# Patient Record
Sex: FEMALE | Race: WHITE | NOT HISPANIC OR LATINO | URBAN - METROPOLITAN AREA
[De-identification: names, ages, dates, MRNs, and addresses within clinical notes are randomized per-mention and may not be internally consistent; named-entity substitution may affect disease eponyms.]

---

## 2018-06-14 ENCOUNTER — EMERGENCY (EMERGENCY)
Facility: HOSPITAL | Age: 58
LOS: 1 days | Discharge: HOME | End: 2018-06-14
Attending: EMERGENCY MEDICINE
Payer: COMMERCIAL

## 2018-06-14 VITALS
SYSTOLIC BLOOD PRESSURE: 156 MMHG | HEIGHT: 63 IN | WEIGHT: 134.92 LBS | DIASTOLIC BLOOD PRESSURE: 88 MMHG | OXYGEN SATURATION: 98 % | TEMPERATURE: 98 F | HEART RATE: 84 BPM | RESPIRATION RATE: 18 BRPM

## 2018-06-14 LAB
ALBUMIN SERPL ELPH-MCNC: 4.5 G/DL — SIGNIFICANT CHANGE UP (ref 3.3–5.2)
ALP SERPL-CCNC: 72 U/L — SIGNIFICANT CHANGE UP (ref 40–120)
ALT FLD-CCNC: 20 U/L — SIGNIFICANT CHANGE UP
ANION GAP SERPL CALC-SCNC: 14 MMOL/L — SIGNIFICANT CHANGE UP (ref 5–17)
AST SERPL-CCNC: 20 U/L — SIGNIFICANT CHANGE UP
BASOPHILS # BLD AUTO: 0 K/UL — SIGNIFICANT CHANGE UP (ref 0–0.2)
BASOPHILS NFR BLD AUTO: 0.4 % — SIGNIFICANT CHANGE UP (ref 0–2)
BILIRUB SERPL-MCNC: 0.3 MG/DL — LOW (ref 0.4–2)
BUN SERPL-MCNC: 14 MG/DL — SIGNIFICANT CHANGE UP (ref 8–20)
CALCIUM SERPL-MCNC: 9.6 MG/DL — SIGNIFICANT CHANGE UP (ref 8.6–10.2)
CHLORIDE SERPL-SCNC: 104 MMOL/L — SIGNIFICANT CHANGE UP (ref 98–107)
CO2 SERPL-SCNC: 25 MMOL/L — SIGNIFICANT CHANGE UP (ref 22–29)
CREAT SERPL-MCNC: 0.68 MG/DL — SIGNIFICANT CHANGE UP (ref 0.5–1.3)
EOSINOPHIL # BLD AUTO: 0.2 K/UL — SIGNIFICANT CHANGE UP (ref 0–0.5)
EOSINOPHIL NFR BLD AUTO: 3.9 % — SIGNIFICANT CHANGE UP (ref 0–6)
GLUCOSE SERPL-MCNC: 104 MG/DL — SIGNIFICANT CHANGE UP (ref 70–115)
HCT VFR BLD CALC: 38.1 % — SIGNIFICANT CHANGE UP (ref 37–47)
HGB BLD-MCNC: 12.7 G/DL — SIGNIFICANT CHANGE UP (ref 12–16)
LYMPHOCYTES # BLD AUTO: 1.8 K/UL — SIGNIFICANT CHANGE UP (ref 1–4.8)
LYMPHOCYTES # BLD AUTO: 36.5 % — SIGNIFICANT CHANGE UP (ref 20–55)
MCHC RBC-ENTMCNC: 30.1 PG — SIGNIFICANT CHANGE UP (ref 27–31)
MCHC RBC-ENTMCNC: 33.3 G/DL — SIGNIFICANT CHANGE UP (ref 32–36)
MCV RBC AUTO: 90.3 FL — SIGNIFICANT CHANGE UP (ref 81–99)
MONOCYTES # BLD AUTO: 0.6 K/UL — SIGNIFICANT CHANGE UP (ref 0–0.8)
MONOCYTES NFR BLD AUTO: 11.6 % — HIGH (ref 3–10)
NEUTROPHILS # BLD AUTO: 2.3 K/UL — SIGNIFICANT CHANGE UP (ref 1.8–8)
NEUTROPHILS NFR BLD AUTO: 47.2 % — SIGNIFICANT CHANGE UP (ref 37–73)
PLATELET # BLD AUTO: 216 K/UL — SIGNIFICANT CHANGE UP (ref 150–400)
POTASSIUM SERPL-MCNC: 4.1 MMOL/L — SIGNIFICANT CHANGE UP (ref 3.5–5.3)
POTASSIUM SERPL-SCNC: 4.1 MMOL/L — SIGNIFICANT CHANGE UP (ref 3.5–5.3)
PROT SERPL-MCNC: 7.6 G/DL — SIGNIFICANT CHANGE UP (ref 6.6–8.7)
RBC # BLD: 4.22 M/UL — LOW (ref 4.4–5.2)
RBC # FLD: 13 % — SIGNIFICANT CHANGE UP (ref 11–15.6)
SODIUM SERPL-SCNC: 143 MMOL/L — SIGNIFICANT CHANGE UP (ref 135–145)
WBC # BLD: 4.9 K/UL — SIGNIFICANT CHANGE UP (ref 4.8–10.8)
WBC # FLD AUTO: 4.9 K/UL — SIGNIFICANT CHANGE UP (ref 4.8–10.8)

## 2018-06-14 PROCEDURE — 99284 EMERGENCY DEPT VISIT MOD MDM: CPT | Mod: 25

## 2018-06-14 PROCEDURE — 85027 COMPLETE CBC AUTOMATED: CPT

## 2018-06-14 PROCEDURE — 36415 COLL VENOUS BLD VENIPUNCTURE: CPT

## 2018-06-14 PROCEDURE — 96374 THER/PROPH/DIAG INJ IV PUSH: CPT

## 2018-06-14 PROCEDURE — 80053 COMPREHEN METABOLIC PANEL: CPT

## 2018-06-14 RX ORDER — AZTREONAM 2 G
1 VIAL (EA) INJECTION
Qty: 20 | Refills: 0 | OUTPATIENT
Start: 2018-06-14 | End: 2018-06-23

## 2018-06-14 RX ORDER — PIPERACILLIN AND TAZOBACTAM 4; .5 G/20ML; G/20ML
3.38 INJECTION, POWDER, LYOPHILIZED, FOR SOLUTION INTRAVENOUS ONCE
Qty: 0 | Refills: 0 | Status: COMPLETED | OUTPATIENT
Start: 2018-06-14 | End: 2018-06-14

## 2018-06-14 RX ADMIN — PIPERACILLIN AND TAZOBACTAM 200 GRAM(S): 4; .5 INJECTION, POWDER, LYOPHILIZED, FOR SOLUTION INTRAVENOUS at 06:35

## 2018-06-14 RX ADMIN — Medication 1 TABLET(S): at 06:35

## 2018-06-14 NOTE — ED PROVIDER NOTE - OBJECTIVE STATEMENT
57y old with complaints of left arm rash went to see pmd, given antifungal cream, applied, and noted gradual increasing erythema, on medial aspect of left arm, and another area on leg. gradual, did not take Tylenol or motrin. complaints of itching, gradual increasing. no relieving factors

## 2018-06-14 NOTE — ED PROVIDER NOTE - PHYSICAL EXAMINATION
Constitutional : Appears comfortably, talking in full sentences  Head :NC AT , no swelling  Eyes :eomi, no swelling  Mouth :mm moist,  Neck : supple, trachea in midline  Chest :Kelvin air entry, symm chest expansion, no distress  Heart :S1 S2 distant  Abdomen :abd soft, non tender  Musc/Skel :ext no swelling, except medial aspect of left arm  erythema, warm, tenderness, impetigo  no deformity, no spine tenderness, distal pulses present  Neuro  :AAO 3 no focal deficits

## 2018-06-14 NOTE — ED ADULT TRIAGE NOTE - CHIEF COMPLAINT QUOTE
I am having numbness in my lt hand it started 3 hrs ago.  I am using a cream for ring worm I don't know if it is doing it

## 2018-06-14 NOTE — ED PROVIDER NOTE - NS ED ROS FT
no weight change, no fever or chills  no recent travel, no recent abox, no sick contacts  no recent change in medications  + rash, no bruises  no visual changes no eye discharge  no cough cold or congestion,   no sob, no chest pain  no orthopnea, no pnd  no abd pain, no n/v/d  no hematuria, no change in urinary habits  no joint pain, no deformity  no headache, no paresthesia

## 2018-06-14 NOTE — ED ADULT NURSE NOTE - OBJECTIVE STATEMENT
Pt received in CDU 1-R c/o "stiffness to my left hand when I was griping the streering wheel". Pt alsdo with fungal ringworm infection that pt started applying Ketoconazole to earlier today. Pt noted to have a red induration surrounding her ringworm infection which is located to inner left bicep. Pt also states she has a wound to the lateral aspect of her left thigh that her DrNadeen states is the same thing. Pt has been applying cream to the thigh as well and there is no induration noted to thigh. Pt denies fever/chills, N/V/D, cough, chest pain, HA, dizziness, weakness, numbness/tingling. Pt able to MAEx4 with equal strength and purpose. No focal deficits noted.

## 2018-06-21 ENCOUNTER — INPATIENT (INPATIENT)
Facility: HOSPITAL | Age: 58
LOS: 3 days | Discharge: HOME | DRG: 603 | End: 2018-06-25
Attending: INTERNAL MEDICINE | Admitting: GENERAL ACUTE CARE HOSPITAL
Payer: COMMERCIAL

## 2018-06-21 VITALS
SYSTOLIC BLOOD PRESSURE: 133 MMHG | RESPIRATION RATE: 20 BRPM | HEART RATE: 104 BPM | OXYGEN SATURATION: 99 % | TEMPERATURE: 99 F | DIASTOLIC BLOOD PRESSURE: 90 MMHG | WEIGHT: 138.01 LBS | HEIGHT: 63 IN

## 2018-06-21 LAB
ALBUMIN SERPL ELPH-MCNC: 4.3 G/DL — SIGNIFICANT CHANGE UP (ref 3.3–5.2)
ALP SERPL-CCNC: 93 U/L — SIGNIFICANT CHANGE UP (ref 40–120)
ALT FLD-CCNC: 106 U/L — HIGH
ANION GAP SERPL CALC-SCNC: 16 MMOL/L — SIGNIFICANT CHANGE UP (ref 5–17)
APPEARANCE UR: CLEAR — SIGNIFICANT CHANGE UP
AST SERPL-CCNC: 56 U/L — HIGH
BASOPHILS # BLD AUTO: 0 K/UL — SIGNIFICANT CHANGE UP (ref 0–0.2)
BASOPHILS NFR BLD AUTO: 0.3 % — SIGNIFICANT CHANGE UP (ref 0–2)
BILIRUB SERPL-MCNC: 0.3 MG/DL — LOW (ref 0.4–2)
BILIRUB UR-MCNC: NEGATIVE — SIGNIFICANT CHANGE UP
BUN SERPL-MCNC: 14 MG/DL — SIGNIFICANT CHANGE UP (ref 8–20)
CALCIUM SERPL-MCNC: 9.3 MG/DL — SIGNIFICANT CHANGE UP (ref 8.6–10.2)
CHLORIDE SERPL-SCNC: 98 MMOL/L — SIGNIFICANT CHANGE UP (ref 98–107)
CK SERPL-CCNC: 94 U/L — SIGNIFICANT CHANGE UP (ref 25–170)
CO2 SERPL-SCNC: 20 MMOL/L — LOW (ref 22–29)
COLOR SPEC: YELLOW — SIGNIFICANT CHANGE UP
CREAT SERPL-MCNC: 0.93 MG/DL — SIGNIFICANT CHANGE UP (ref 0.5–1.3)
DIFF PNL FLD: ABNORMAL
EOSINOPHIL # BLD AUTO: 0.1 K/UL — SIGNIFICANT CHANGE UP (ref 0–0.5)
EOSINOPHIL NFR BLD AUTO: 2.3 % — SIGNIFICANT CHANGE UP (ref 0–6)
GLUCOSE SERPL-MCNC: 131 MG/DL — HIGH (ref 70–115)
GLUCOSE UR QL: NEGATIVE MG/DL — SIGNIFICANT CHANGE UP
HCT VFR BLD CALC: 36.6 % — LOW (ref 37–47)
HGB BLD-MCNC: 12.6 G/DL — SIGNIFICANT CHANGE UP (ref 12–16)
KETONES UR-MCNC: ABNORMAL
LACTATE BLDV-MCNC: 1.6 MMOL/L — SIGNIFICANT CHANGE UP (ref 0.5–2)
LEUKOCYTE ESTERASE UR-ACNC: NEGATIVE — SIGNIFICANT CHANGE UP
LIDOCAIN IGE QN: 23 U/L — SIGNIFICANT CHANGE UP (ref 22–51)
LYMPHOCYTES # BLD AUTO: 0.5 K/UL — LOW (ref 1–4.8)
LYMPHOCYTES # BLD AUTO: 12.3 % — LOW (ref 20–55)
MCHC RBC-ENTMCNC: 30.4 PG — SIGNIFICANT CHANGE UP (ref 27–31)
MCHC RBC-ENTMCNC: 34.4 G/DL — SIGNIFICANT CHANGE UP (ref 32–36)
MCV RBC AUTO: 88.2 FL — SIGNIFICANT CHANGE UP (ref 81–99)
MONOCYTES # BLD AUTO: 0.6 K/UL — SIGNIFICANT CHANGE UP (ref 0–0.8)
MONOCYTES NFR BLD AUTO: 14.3 % — HIGH (ref 3–10)
NEUTROPHILS # BLD AUTO: 2.7 K/UL — SIGNIFICANT CHANGE UP (ref 1.8–8)
NEUTROPHILS NFR BLD AUTO: 68 % — SIGNIFICANT CHANGE UP (ref 37–73)
NITRITE UR-MCNC: NEGATIVE — SIGNIFICANT CHANGE UP
PH UR: 6 — SIGNIFICANT CHANGE UP (ref 5–8)
PLATELET # BLD AUTO: 247 K/UL — SIGNIFICANT CHANGE UP (ref 150–400)
POTASSIUM SERPL-MCNC: 4 MMOL/L — SIGNIFICANT CHANGE UP (ref 3.5–5.3)
POTASSIUM SERPL-SCNC: 4 MMOL/L — SIGNIFICANT CHANGE UP (ref 3.5–5.3)
PROT SERPL-MCNC: 7.7 G/DL — SIGNIFICANT CHANGE UP (ref 6.6–8.7)
PROT UR-MCNC: 30 MG/DL
RBC # BLD: 4.15 M/UL — LOW (ref 4.4–5.2)
RBC # FLD: 12.9 % — SIGNIFICANT CHANGE UP (ref 11–15.6)
SODIUM SERPL-SCNC: 134 MMOL/L — LOW (ref 135–145)
SP GR SPEC: 1.02 — SIGNIFICANT CHANGE UP (ref 1.01–1.02)
UROBILINOGEN FLD QL: NEGATIVE MG/DL — SIGNIFICANT CHANGE UP
WBC # BLD: 4 K/UL — LOW (ref 4.8–10.8)
WBC # FLD AUTO: 4 K/UL — LOW (ref 4.8–10.8)

## 2018-06-21 PROCEDURE — 76705 ECHO EXAM OF ABDOMEN: CPT | Mod: 26

## 2018-06-21 PROCEDURE — 99285 EMERGENCY DEPT VISIT HI MDM: CPT | Mod: 25

## 2018-06-21 RX ORDER — DIPHENHYDRAMINE HCL 50 MG
50 CAPSULE ORAL EVERY 4 HOURS
Qty: 0 | Refills: 0 | Status: DISCONTINUED | OUTPATIENT
Start: 2018-06-21 | End: 2018-06-25

## 2018-06-21 RX ORDER — KETOROLAC TROMETHAMINE 30 MG/ML
30 SYRINGE (ML) INJECTION ONCE
Qty: 0 | Refills: 0 | Status: DISCONTINUED | OUTPATIENT
Start: 2018-06-21 | End: 2018-06-21

## 2018-06-21 RX ORDER — FAMOTIDINE 10 MG/ML
20 INJECTION INTRAVENOUS ONCE
Qty: 0 | Refills: 0 | Status: COMPLETED | OUTPATIENT
Start: 2018-06-21 | End: 2018-06-21

## 2018-06-21 RX ORDER — ONDANSETRON 8 MG/1
4 TABLET, FILM COATED ORAL ONCE
Qty: 0 | Refills: 0 | Status: COMPLETED | OUTPATIENT
Start: 2018-06-21 | End: 2018-06-21

## 2018-06-21 RX ADMIN — Medication 30 MILLIGRAM(S): at 20:46

## 2018-06-21 RX ADMIN — ONDANSETRON 4 MILLIGRAM(S): 8 TABLET, FILM COATED ORAL at 20:46

## 2018-06-21 NOTE — ED PROVIDER NOTE - SHIFT CHANGE DETAILS
F/U CT scan  If no acute pathology on CT PO challenge and treat for cellulitis change to another abx.

## 2018-06-21 NOTE — ED PROVIDER NOTE - MEDICAL DECISION MAKING DETAILS
Abdominal pain, nausea, and vomiting with RUQ tenderness on exam.  Will provide anti-emetics, pain medication, check labs, US, and Re-eval  Right chest wall redness appearing as cellulitis patient currently on bactrim will give clindamycin.

## 2018-06-21 NOTE — ED PROVIDER NOTE - OBJECTIVE STATEMENT
This patient is a 58 year old woman recently seen in the ER 1 week ago for redness of the left arm discharged with Bactrim who presents to the ER c/o redness to right upper chest wall x 2 days and nausea and vomiting today.  Patient states that the redness that occurred during last ER visit improved.  2 days ago she noticed redness to the right upper chest that has been getting worse.  The redness is associated with itching.  She went to her PMD and was told to use of cortisone cream.  Patient has still been taking Bactrim twice a day as instructed for last cellulitis diagnosis one week ago.  Today she developed severe diffuse abdominal pain and vomiting.  She denies fever, dysuria, and hematuria.

## 2018-06-22 DIAGNOSIS — L03.90 CELLULITIS, UNSPECIFIED: ICD-10-CM

## 2018-06-22 PROCEDURE — 99223 1ST HOSP IP/OBS HIGH 75: CPT

## 2018-06-22 PROCEDURE — 71260 CT THORAX DX C+: CPT | Mod: 26

## 2018-06-22 PROCEDURE — 99218: CPT

## 2018-06-22 PROCEDURE — 74177 CT ABD & PELVIS W/CONTRAST: CPT | Mod: 26

## 2018-06-22 RX ORDER — HYDROXYZINE HCL 10 MG
50 TABLET ORAL ONCE
Qty: 0 | Refills: 0 | Status: COMPLETED | OUTPATIENT
Start: 2018-06-22 | End: 2018-06-22

## 2018-06-22 RX ORDER — ACETAMINOPHEN 500 MG
650 TABLET ORAL ONCE
Qty: 0 | Refills: 0 | Status: COMPLETED | OUTPATIENT
Start: 2018-06-22 | End: 2018-06-22

## 2018-06-22 RX ORDER — MUPIROCIN 20 MG/G
1 OINTMENT TOPICAL EVERY 8 HOURS
Qty: 0 | Refills: 0 | Status: DISCONTINUED | OUTPATIENT
Start: 2018-06-22 | End: 2018-06-25

## 2018-06-22 RX ORDER — SODIUM CHLORIDE 9 MG/ML
1000 INJECTION INTRAMUSCULAR; INTRAVENOUS; SUBCUTANEOUS
Qty: 0 | Refills: 0 | Status: COMPLETED | OUTPATIENT
Start: 2018-06-22 | End: 2018-06-23

## 2018-06-22 RX ORDER — PIPERACILLIN AND TAZOBACTAM 4; .5 G/20ML; G/20ML
3.38 INJECTION, POWDER, LYOPHILIZED, FOR SOLUTION INTRAVENOUS ONCE
Qty: 0 | Refills: 0 | Status: DISCONTINUED | OUTPATIENT
Start: 2018-06-22 | End: 2018-06-22

## 2018-06-22 RX ORDER — FAMOTIDINE 10 MG/ML
20 INJECTION INTRAVENOUS ONCE
Qty: 0 | Refills: 0 | Status: COMPLETED | OUTPATIENT
Start: 2018-06-22 | End: 2018-06-22

## 2018-06-22 RX ORDER — SACCHAROMYCES BOULARDII 250 MG
250 POWDER IN PACKET (EA) ORAL
Qty: 0 | Refills: 0 | Status: DISCONTINUED | OUTPATIENT
Start: 2018-06-22 | End: 2018-06-25

## 2018-06-22 RX ORDER — VANCOMYCIN HCL 1 G
1000 VIAL (EA) INTRAVENOUS ONCE
Qty: 0 | Refills: 0 | Status: COMPLETED | OUTPATIENT
Start: 2018-06-22 | End: 2018-06-22

## 2018-06-22 RX ORDER — DIPHENHYDRAMINE HCL 50 MG
25 CAPSULE ORAL EVERY 6 HOURS
Qty: 0 | Refills: 0 | Status: DISCONTINUED | OUTPATIENT
Start: 2018-06-22 | End: 2018-06-22

## 2018-06-22 RX ORDER — DAPTOMYCIN 500 MG/10ML
380 INJECTION, POWDER, LYOPHILIZED, FOR SOLUTION INTRAVENOUS EVERY 24 HOURS
Qty: 0 | Refills: 0 | Status: DISCONTINUED | OUTPATIENT
Start: 2018-06-22 | End: 2018-06-25

## 2018-06-22 RX ORDER — PIPERACILLIN AND TAZOBACTAM 4; .5 G/20ML; G/20ML
3.38 INJECTION, POWDER, LYOPHILIZED, FOR SOLUTION INTRAVENOUS ONCE
Qty: 0 | Refills: 0 | Status: COMPLETED | OUTPATIENT
Start: 2018-06-22 | End: 2018-06-22

## 2018-06-22 RX ORDER — ENOXAPARIN SODIUM 100 MG/ML
40 INJECTION SUBCUTANEOUS DAILY
Qty: 0 | Refills: 0 | Status: DISCONTINUED | OUTPATIENT
Start: 2018-06-23 | End: 2018-06-25

## 2018-06-22 RX ORDER — CETIRIZINE HYDROCHLORIDE 10 MG/1
10 TABLET ORAL ONCE
Qty: 0 | Refills: 0 | Status: COMPLETED | OUTPATIENT
Start: 2018-06-22 | End: 2018-06-22

## 2018-06-22 RX ORDER — FAMOTIDINE 10 MG/ML
20 INJECTION INTRAVENOUS
Qty: 0 | Refills: 0 | Status: DISCONTINUED | OUTPATIENT
Start: 2018-06-22 | End: 2018-06-25

## 2018-06-22 RX ORDER — PIPERACILLIN AND TAZOBACTAM 4; .5 G/20ML; G/20ML
3.38 INJECTION, POWDER, LYOPHILIZED, FOR SOLUTION INTRAVENOUS EVERY 6 HOURS
Qty: 0 | Refills: 0 | Status: DISCONTINUED | OUTPATIENT
Start: 2018-06-22 | End: 2018-06-22

## 2018-06-22 RX ORDER — ACETAMINOPHEN 500 MG
650 TABLET ORAL EVERY 6 HOURS
Qty: 0 | Refills: 0 | Status: DISCONTINUED | OUTPATIENT
Start: 2018-06-22 | End: 2018-06-25

## 2018-06-22 RX ORDER — SODIUM CHLORIDE 9 MG/ML
1000 INJECTION INTRAMUSCULAR; INTRAVENOUS; SUBCUTANEOUS ONCE
Qty: 0 | Refills: 0 | Status: COMPLETED | OUTPATIENT
Start: 2018-06-22 | End: 2018-06-22

## 2018-06-22 RX ORDER — PIPERACILLIN AND TAZOBACTAM 4; .5 G/20ML; G/20ML
INJECTION, POWDER, LYOPHILIZED, FOR SOLUTION INTRAVENOUS
Qty: 0 | Refills: 0 | Status: DISCONTINUED | OUTPATIENT
Start: 2018-06-22 | End: 2018-06-22

## 2018-06-22 RX ORDER — CETIRIZINE HYDROCHLORIDE 10 MG/1
10 TABLET ORAL ONCE
Qty: 0 | Refills: 0 | Status: DISCONTINUED | OUTPATIENT
Start: 2018-06-22 | End: 2018-06-22

## 2018-06-22 RX ADMIN — DAPTOMYCIN 115.2 MILLIGRAM(S): 500 INJECTION, POWDER, LYOPHILIZED, FOR SOLUTION INTRAVENOUS at 14:18

## 2018-06-22 RX ADMIN — Medication 30 MILLIGRAM(S): at 00:23

## 2018-06-22 RX ADMIN — SODIUM CHLORIDE 75 MILLILITER(S): 9 INJECTION INTRAMUSCULAR; INTRAVENOUS; SUBCUTANEOUS at 17:02

## 2018-06-22 RX ADMIN — Medication 100 MILLIGRAM(S): at 00:14

## 2018-06-22 RX ADMIN — Medication 50 MILLIGRAM(S): at 04:36

## 2018-06-22 RX ADMIN — Medication 650 MILLIGRAM(S): at 13:03

## 2018-06-22 RX ADMIN — Medication 250 MILLIGRAM(S): at 05:20

## 2018-06-22 RX ADMIN — PIPERACILLIN AND TAZOBACTAM 200 GRAM(S): 4; .5 INJECTION, POWDER, LYOPHILIZED, FOR SOLUTION INTRAVENOUS at 04:42

## 2018-06-22 RX ADMIN — Medication 50 MILLIGRAM(S): at 21:50

## 2018-06-22 RX ADMIN — FAMOTIDINE 20 MILLIGRAM(S): 10 INJECTION INTRAVENOUS at 17:39

## 2018-06-22 RX ADMIN — SODIUM CHLORIDE 1000 MILLILITER(S): 9 INJECTION INTRAMUSCULAR; INTRAVENOUS; SUBCUTANEOUS at 13:04

## 2018-06-22 RX ADMIN — FAMOTIDINE 20 MILLIGRAM(S): 10 INJECTION INTRAVENOUS at 13:08

## 2018-06-22 RX ADMIN — CETIRIZINE HYDROCHLORIDE 10 MILLIGRAM(S): 10 TABLET ORAL at 14:49

## 2018-06-22 RX ADMIN — FAMOTIDINE 20 MILLIGRAM(S): 10 INJECTION INTRAVENOUS at 00:15

## 2018-06-22 RX ADMIN — Medication 650 MILLIGRAM(S): at 14:00

## 2018-06-22 RX ADMIN — Medication 40 MILLIGRAM(S): at 16:59

## 2018-06-22 RX ADMIN — Medication 250 MILLIGRAM(S): at 17:01

## 2018-06-22 RX ADMIN — Medication 50 MILLIGRAM(S): at 00:16

## 2018-06-22 NOTE — ED ADULT NURSE REASSESSMENT NOTE - NS ED NURSE REASSESS COMMENT FT1
ao color good skin warm dry resp even unlabored iv infusing w/o diff site clean dsg intact no further c/o nausea itch improved with medication.  received abx w/o diff to be observed

## 2018-06-22 NOTE — ED CDU PROVIDER DISPOSITION NOTE - CLINICAL COURSE
Pt kept on observation for antibiotics; developed worsening urticaria throughout stay despite multiple antihistamines with no evidence of anaphylaxis; pt also spiked fever during her observation;  admitted to hospital for further management.

## 2018-06-22 NOTE — H&P ADULT - FAMILY HISTORY
Sibling  Still living? Unknown  Family history of mixed connective tissue disease, Age at diagnosis: Age Unknown

## 2018-06-22 NOTE — H&P ADULT - HISTORY OF PRESENT ILLNESS
Patient is 58 year old female with PMH of Bilateral Tubal Ligation who presented to the ED with complaints of right upper chest wall circumscribed rash. Patient reports she initially reported to the ED on 6/14 for evaluation of a left arm rash. She had went to see her PMD who prescribed an antifungal cream, but she noted increasing erythema on the medial aspect of left arm and was sent to the ED. Patient was then diagnosed with Erysipelas and discharged home with Bactrim. Patient reports on the 8th day she developed a right upper chest wall rash. She initially thought it was a reaction to the bactrim, but was instructed to report to the ED due to concerns for cellulitis. In the ED, patient was given clindamycin, followed by vanco and zosyn and developed a generalized macular-papular rash (chest/abdomen/back) without any respiratory compromise. ID was consulted and patient was switched to Daptomycin. Patient also reports having diarrhea while on bactrim, which has resolved but yesterday she reports having abdominal pain and vomiting. In the ED, patient had a CT abdomen/pelvis and US which was negative for any intra-abdominal pathology. Patient does report having a fever, but denies chills, headache, blurry vision, chest pain, SOB, nausea/vomiting or diarrhea at this present time. LFTS mildly elevated and likely due to bactrim.

## 2018-06-22 NOTE — H&P ADULT - ASSESSMENT
Patient is 58 year old female with PMH of Bilateral Tubal Ligation who presented to the ED with complaints of right upper chest wall circumscribed rash and admitted with cellulitis. Subsequently, developed an allergic reaction to antibiotics administered in the ED.    1. Right upper chest wall cellulitis  -admit to general medical floor  -sepsis not present on admission  -IV daptomycin per ID  -f/u blood cultures  -florastar BID  -monitor fever curve  -judicious hydration    2. Allergic reaction  -? PCN allergy  -IV benadryl  -Pepcid BID  -short course of prednisone    3. Nausea/vomiting  -likely secondary to bactrim, which has now resolved  -zofran PRN  -CT abdomen unremarkable    4. Transaminitis  -normal LFTS on 6/14  -now with elevation post bactrim treatment  -trend LFTS  -CT and ultrasound negative    5. Anion gap metabolic acidosis  -secondary to starvation ketoacidosis  -lactate within normal limits  -patient with poor PO intake prior to presenting to the ED  -able to tolerate PO in the ER    6. Mild hyponatremia  -likely due to hypovolemia  -trial of judicious hydration  -urine lytes  -monitor serum sodium closely    6. DVT prophylaxis - early ambulation Lovenox SC Patient is 58 year old female with PMH of Bilateral Tubal Ligation who presented to the ED with complaints of right upper chest wall circumscribed rash and admitted with cellulitis. Subsequently, developed an allergic reaction to antibiotics administered in the ED.    1. Right upper chest wall cellulitis  -admit to general medical floor  -sepsis not present on admission  -IV daptomycin per ID  -f/u blood cultures  -florastar BID  -monitor fever curve  -judicious hydration  -CT chest - slight asymmetric skin thickening in the superior lateral right chest. There is no subcutaneous stranding or fluid collection    2. Allergic reaction  -? PCN allergy  -IV benadryl  -Pepcid BID  -short course of prednisone    3. Nausea/vomiting  -likely secondary to bactrim, which has now resolved  -zofran PRN  -CT abdomen unremarkable    4. Transaminitis  -normal LFTS on 6/14  -now with elevation post bactrim treatment  -trend LFTS  -CT and ultrasound negative    5. Anion gap metabolic acidosis  -secondary to starvation ketoacidosis  -lactate within normal limits  -patient with poor PO intake prior to presenting to the ED  -able to tolerate PO in the ER    6. Mild hyponatremia  -likely due to hypovolemia  -trial of judicious hydration  -urine lytes  -monitor serum sodium closely    6. DVT prophylaxis - early ambulation Lovenox SC

## 2018-06-22 NOTE — ED ADULT NURSE REASSESSMENT NOTE - NS ED NURSE REASSESS COMMENT FT1
pt care resumed at this time, pt recvd sitting comfortably on side of stretcher, safety maintained, A&Ox3, reports generalized pruritis, order initiated, pt tolerated well, offers no other complaints, POC discussed with pt awaiting bed assignment, VSS, call placed to receiving RNJanie who request a call back.

## 2018-06-22 NOTE — ED ADULT NURSE REASSESSMENT NOTE - NS ED NURSE REASSESS COMMENT FT1
MD. Bentley called because patient and family member had questions about medications. patient informed on plan of care.

## 2018-06-22 NOTE — ED ADULT NURSE REASSESSMENT NOTE - NS ED NURSE REASSESS COMMENT FT1
patient received from night RN. patient awake, alert, oriented x3. patient state patient received from night RN. patient awake, alert, oriented x3. patient has a right sided chest wall red rash that she is complaining of itchiness. patient states she was here on the 14th on the left inside of arm, started antibiotic and bactrim for home and then appeared on the chest wall. patient states from the antibiotics she is thinking it messed up her stomach. patient in no apparent distress at this time. breathing even and unlabored, vss. as documented. i patient received from night RN. patient awake, alert, oriented x3. patient has a right sided chest wall red rash that she is complaining of itchiness. patient states she was here on the 14th on the left inside of arm, started antibiotic and bactrim for home and then appeared on the chest wall. patient states from the antibiotics she is thinking it messed up her stomach. patient in no apparent distress at this time. breathing even and unlabored, vss. as documented. informed on plan of care. will continue to monitor.

## 2018-06-22 NOTE — CONSULT NOTE ADULT - SUBJECTIVE AND OBJECTIVE BOX
[facility]  INFECTIOUS DISEASES AND INTERNAL MEDICINE at [place]  =======================================================  Surendra Torres MD  Diplomates American Board of Internal Medicine and Infectious Diseases  =======================================================    MRN-[id number]  MILAGROS BRUCE     CC: vomiting and skin infectin    HPI:  57y/o woman with no PMH was seen on  with left arm cellulitis and bactrim was started with improvement in cellulites but she was admitted last night with severe nausea and vomiting and a new cellulitis in R upper chest on ant axillary line.   Since yesterday she has been on zosyn and vancomycin with some improvement but developed rash on chest and arms after IV ABx, as far as she knows, never had any allergic reaction to PCN.   No fever. Vomiting stopped after receiving IV H2 blocker and ondansetron.   This morning she is complaining of rash on chest and itching.     PAST MEDICAL & SURGICAL HISTORY:  No pertinent past medical history  H/O tubal ligation  H/O  section: x1  History of knee surgery  History of deviated nasal septum    Social Hx: no smoking or drugs    FAMILY HISTORY:  noncontributory    Allergies  No Known Allergies    gluten intolerance (Unknown)      Antibiotics:  mupirocin 2% Ointment 1 Application(s) Topical every 8 hours  piperacillin/tazobactam IVPB. 3.375 Gram(s) IV Intermittent every 6 hours     REVIEW OF SYSTEMS:  CONSTITUTIONAL:  No Fever or chills  HEENT:  No diplopia or blurred vision.  No sore throat or runny nose.  CARDIOVASCULAR:  No chest pain or SOB.  RESPIRATORY:  No cough, shortness of breath, PND or orthopnea.  GASTROINTESTINAL:  No nausea, vomiting or diarrhea.  GENITOURINARY:  No dysuria, frequency or urgency. No Blood in urine  MUSCULOSKELETAL:  no joint aches, no muscle pain  SKIN:  rash+, skin infection   NEUROLOGIC:  No paresthesias, fasciculations, seizures or weakness.  PSYCHIATRIC:  No disorder of thought or mood.  ENDOCRINE:  No heat or cold intolerance, polyuria or polydipsia.  HEMATOLOGICAL:  No easy bruising or bleeding.     Physical Exam:  Vital Signs Last 24 Hrs  T(C): 36.8 (2018 08:19), Max: 37.1 (2018 18:30)  T(F): 98.3 (2018 08:19), Max: 98.8 (2018 00:33)  HR: 77 (2018 08:19) (77 - 104)  BP: 115/73 (2018 08:19) (115/73 - 138/85)  RR: 18 (2018 08:19) (18 - 20)  SpO2: 99% (2018 08:19) (99% - 99%)  Height (cm): 160.02 ( @ 18:30)  Weight (kg): 62.6 ( @ 18:30)  BMI (kg/m2): 24.4 ( @ 18:30)  BSA (m2): 1.65 ( @ 18:30)  GEN: NAD  HEENT: normocephalic and atraumatic. EOMI. PERRL.    NECK: Supple.  No lymphadenopathy   LUNGS: Clear to auscultation.  HEART: Regular rate and rhythm without murmur.  ABDOMEN: Soft, nontender, and nondistended.  Positive bowel sounds.    EXTREMITIES: rash on extremities    NEUROLOGIC: grossly intact.  PSYCHIATRIC: Appropriate affect .  SKIN: maculopapular rash on chest and extremities  Right ant axillary line with erythema and possible cellulitis about 3inches expanding from the lines around the erythema    Labs:      134<L>  |  98  |  14.0  ----------------------------<  131<H>  4.0   |  20.0<L>  |  0.93    Ca    9.3      2018 20:52    TPro  7.7  /  Alb  4.3  /  TBili  0.3<L>  /  DBili  x   /  AST  56<H>  /  ALT  106<H>  /  AlkPhos  93                         12.6   4.0   )-----------( 247      ( 2018 20:52 )             36.6     Urinalysis Basic - ( 2018 20:49 )    Color: Yellow / Appearance: Clear / S.025 / pH: x  Gluc: x / Ketone: Moderate  / Bili: Negative / Urobili: Negative mg/dL   Blood: x / Protein: 30 mg/dL / Nitrite: Negative   Leuk Esterase: Negative / RBC: 0-2 /HPF / WBC 0-2   Sq Epi: x / Non Sq Epi: Occasional / Bacteria: Occasional      LIVER FUNCTIONS - ( 2018 20:52 )  Alb: 4.3 g/dL / Pro: 7.7 g/dL / ALK PHOS: 93 U/L / ALT: 106 U/L / AST: 56 U/L / GGT: x           CARDIAC MARKERS ( 2018 20:52 )  x     / x     / 94 U/L / x     / x        RECENT CULTURES:  Pending     All imaging and other data have been reviewed.    Abdominal and chest CT:   IMPRESSION:   Chest CT: Clear lungs. Asymmetric right lateral chest wall skin   thickening. No abscess.    CT abdomen/pelvis: Questionable pyloric thickening versus   underdistention. Normal appendix.

## 2018-06-22 NOTE — ED CDU PROVIDER SUBSEQUENT DAY NOTE - HISTORY
patient seen few days ago, placed on bactrim for erysipelas, was getting better, noted a new site on right upper chest, recd clindamycin, made a adele, in 3-4 hrs, exptended beyond adele, plan to obs in ed, ct reviewed results discussed patient seen few days ago, placed on bactrim for erysipelas, was getting better, noted a new site on right upper chest, recd clindamycin, made a adele, in 3-4 hrs, expanded beyond adele, plan to obs in ed, ct reviewed results discussed  called and left a message with ID to see patient in ed, signed out to am ed attending

## 2018-06-22 NOTE — CONSULT NOTE ADULT - ASSESSMENT
57y/o woman with no PMH was seen on 6/14 with left arm cellulitis and bactrim was started with improvement in cellulites but she was admitted last night with severe nausea and vomiting and a new cellulitis in R upper chest on ant axillary line.   The mild cellulitis in right upper chest, it could be 2' to bacterial colonization causing recurrent cellulitis or 2' to bug bites.   GI symptoms and transaminitis possibly 2' to bactrim now resolved.     1-cellulitis:  -Follow up blood cultures  -stop zosyn, possible allergy to PCN and less likely GNR or Anaerobes causing cellulitis   -Stop vancomycin  -Start daptomycin 380mg daily IV for now, when ready for discharge can switch to minocycline or doxycycline.   -Dermatology as outpt     2-Vomiting:  -Possible related to bactrim  -improved  -ALT high due to vomiting/bactrim  -No more bactrim at this time.

## 2018-06-22 NOTE — ED CDU PROVIDER SUBSEQUENT DAY NOTE - PROGRESS NOTE DETAILS
Right upper chest wall celluliits improving but maculopapular rash spreading on chest/ abdomen.  Pt denies itchiness at present. ID recommendations appreciated. Tylenol, fluids, antibiotics, zyrtec, and diet ordered. Pt spiked fever around 1 PM; cultures send;  area of cellulitis not spreading but pt has persistent maculopapular rash/ urticaria with no improvement from zyrtec. WIll dose steroids and admit for further treatment.

## 2018-06-22 NOTE — ED ADULT NURSE REASSESSMENT NOTE - NS ED NURSE REASSESS COMMENT FT1
Call placed to accepting KENDALL Lima pt placed on transfer cardiac monitor awaiting transport to unit at this time. POC discussed with pt who verbalized understanding.

## 2018-06-23 LAB
ALBUMIN SERPL ELPH-MCNC: 3.7 G/DL — SIGNIFICANT CHANGE UP (ref 3.3–5.2)
ALP SERPL-CCNC: 78 U/L — SIGNIFICANT CHANGE UP (ref 40–120)
ALT FLD-CCNC: 70 U/L — HIGH
ANION GAP SERPL CALC-SCNC: 15 MMOL/L — SIGNIFICANT CHANGE UP (ref 5–17)
AST SERPL-CCNC: 32 U/L — HIGH
BASOPHILS # BLD AUTO: 0 K/UL — SIGNIFICANT CHANGE UP (ref 0–0.2)
BASOPHILS NFR BLD AUTO: 0.3 % — SIGNIFICANT CHANGE UP (ref 0–2)
BILIRUB SERPL-MCNC: 0.2 MG/DL — LOW (ref 0.4–2)
BUN SERPL-MCNC: 12 MG/DL — SIGNIFICANT CHANGE UP (ref 8–20)
CALCIUM SERPL-MCNC: 8.9 MG/DL — SIGNIFICANT CHANGE UP (ref 8.6–10.2)
CHLORIDE SERPL-SCNC: 104 MMOL/L — SIGNIFICANT CHANGE UP (ref 98–107)
CO2 SERPL-SCNC: 19 MMOL/L — LOW (ref 22–29)
CREAT SERPL-MCNC: 0.68 MG/DL — SIGNIFICANT CHANGE UP (ref 0.5–1.3)
EOSINOPHIL # BLD AUTO: 0 K/UL — SIGNIFICANT CHANGE UP (ref 0–0.5)
EOSINOPHIL NFR BLD AUTO: 0.3 % — SIGNIFICANT CHANGE UP (ref 0–6)
GLUCOSE SERPL-MCNC: 125 MG/DL — HIGH (ref 70–115)
HCT VFR BLD CALC: 35.3 % — LOW (ref 37–47)
HGB BLD-MCNC: 11.3 G/DL — LOW (ref 12–16)
LYMPHOCYTES # BLD AUTO: 0.9 K/UL — LOW (ref 1–4.8)
LYMPHOCYTES # BLD AUTO: 22.6 % — SIGNIFICANT CHANGE UP (ref 20–55)
MAGNESIUM SERPL-MCNC: 2.1 MG/DL — SIGNIFICANT CHANGE UP (ref 1.6–2.6)
MCHC RBC-ENTMCNC: 29.6 PG — SIGNIFICANT CHANGE UP (ref 27–31)
MCHC RBC-ENTMCNC: 32 G/DL — SIGNIFICANT CHANGE UP (ref 32–36)
MCV RBC AUTO: 92.4 FL — SIGNIFICANT CHANGE UP (ref 81–99)
MONOCYTES # BLD AUTO: 0.3 K/UL — SIGNIFICANT CHANGE UP (ref 0–0.8)
MONOCYTES NFR BLD AUTO: 8.3 % — SIGNIFICANT CHANGE UP (ref 3–10)
NEUTROPHILS # BLD AUTO: 2.7 K/UL — SIGNIFICANT CHANGE UP (ref 1.8–8)
NEUTROPHILS NFR BLD AUTO: 68.2 % — SIGNIFICANT CHANGE UP (ref 37–73)
PHOSPHATE SERPL-MCNC: 3.5 MG/DL — SIGNIFICANT CHANGE UP (ref 2.4–4.7)
PLATELET # BLD AUTO: 218 K/UL — SIGNIFICANT CHANGE UP (ref 150–400)
POTASSIUM SERPL-MCNC: 4.5 MMOL/L — SIGNIFICANT CHANGE UP (ref 3.5–5.3)
POTASSIUM SERPL-SCNC: 4.5 MMOL/L — SIGNIFICANT CHANGE UP (ref 3.5–5.3)
PROT SERPL-MCNC: 6.5 G/DL — LOW (ref 6.6–8.7)
RBC # BLD: 3.82 M/UL — LOW (ref 4.4–5.2)
RBC # FLD: 13.1 % — SIGNIFICANT CHANGE UP (ref 11–15.6)
SODIUM SERPL-SCNC: 138 MMOL/L — SIGNIFICANT CHANGE UP (ref 135–145)
WBC # BLD: 4 K/UL — LOW (ref 4.8–10.8)
WBC # FLD AUTO: 4 K/UL — LOW (ref 4.8–10.8)

## 2018-06-23 PROCEDURE — 99233 SBSQ HOSP IP/OBS HIGH 50: CPT

## 2018-06-23 RX ORDER — ACETAMINOPHEN 500 MG
650 TABLET ORAL EVERY 6 HOURS
Qty: 0 | Refills: 0 | Status: DISCONTINUED | OUTPATIENT
Start: 2018-06-23 | End: 2018-06-25

## 2018-06-23 RX ADMIN — Medication 250 MILLIGRAM(S): at 05:08

## 2018-06-23 RX ADMIN — SODIUM CHLORIDE 75 MILLILITER(S): 9 INJECTION INTRAMUSCULAR; INTRAVENOUS; SUBCUTANEOUS at 00:57

## 2018-06-23 RX ADMIN — Medication 50 MILLIGRAM(S): at 13:30

## 2018-06-23 RX ADMIN — DAPTOMYCIN 115.2 MILLIGRAM(S): 500 INJECTION, POWDER, LYOPHILIZED, FOR SOLUTION INTRAVENOUS at 13:30

## 2018-06-23 RX ADMIN — Medication 250 MILLIGRAM(S): at 17:08

## 2018-06-23 RX ADMIN — FAMOTIDINE 20 MILLIGRAM(S): 10 INJECTION INTRAVENOUS at 17:08

## 2018-06-23 RX ADMIN — FAMOTIDINE 20 MILLIGRAM(S): 10 INJECTION INTRAVENOUS at 05:08

## 2018-06-23 RX ADMIN — Medication 40 MILLIGRAM(S): at 05:08

## 2018-06-23 NOTE — PROGRESS NOTE ADULT - SUBJECTIVE AND OBJECTIVE BOX
[facility]  INFECTIOUS DISEASES AND INTERNAL MEDICINE at [place]  =======================================================  Surendra Torres MD  Diplomates American Board of Internal Medicine and Infectious Diseases  =======================================================    MILAGROS BRUCE [id number]    Follow up:    57y/o woman with no PMH was seen on  with left arm cellulitis and bactrim was started with improvement in cellulites but she was admitted with severe nausea and vomiting and a new cellulitis in R upper chest on ant axillary line.   GI symptoms are gone, LFTs improving, No fever.  Generalized rash 2' to one of the antibiotics she took, bactrim, zosyn and clindamycin (used very close to each other), unclear which one caused rash and allergy. So she was switched to daptomycin.    Cellulitis is improving.     PAST MEDICAL & SURGICAL HISTORY:  No pertinent past medical history  H/O tubal ligation  H/O  section: x1  History of knee surgery  History of deviated nasal septum    Social Hx: no smoking or drugs    FAMILY HISTORY:  noncontributory    Allergies  No Known Allergies    gluten intolerance (Unknown)    Antibiotics:  mupirocin 2% Ointment 1 Application(s) Topical every 8 hours  piperacillin/tazobactam IVPB. 3.375 Gram(s) IV Intermittent every 6 hours     REVIEW OF SYSTEMS:  CONSTITUTIONAL:  No Fever or chills  HEENT:  No diplopia or blurred vision.  No sore throat or runny nose.  CARDIOVASCULAR:  No chest pain or SOB.  RESPIRATORY:  No cough, shortness of breath, PND or orthopnea.  GASTROINTESTINAL:  No nausea, vomiting or diarrhea.  GENITOURINARY:  No dysuria, frequency or urgency. No Blood in urine  MUSCULOSKELETAL:  no joint aches, no muscle pain  SKIN:  rash+ and cellulitis  NEUROLOGIC:  No paresthesias, fasciculations, seizures or weakness.  PSYCHIATRIC:  No disorder of thought or mood.  ENDOCRINE:  No heat or cold intolerance, polyuria or polydipsia.  HEMATOLOGICAL:  No easy bruising or bleeding.     Physical Exam:  Vital Signs Last 24 Hrs  T(C): 36.9 (2018 05:54), Max: 37.8 (2018 19:43)  T(F): 98.4 (2018 05:54), Max: 100 (2018 19:43)  HR: 73 (2018 05:54) (73 - 95)  BP: 108/57 (2018 05:54) (99/59 - 118/76)  RR: 18 (2018 05:54) (18 - 20)  SpO2: 96% (2018 21:55) (96% - 97%)  GEN: NAD  HEENT: normocephalic and atraumatic. EOMI. PERRL.    NECK: Supple.  No lymphadenopathy   LUNGS: Clear to auscultation.  HEART: Regular rate and rhythm without murmur.  ABDOMEN: Soft, nontender, and nondistended.  Positive bowel sounds.    EXTREMITIES: rash is maculopapular and generalized.   NEUROLOGIC: grossly intact.  PSYCHIATRIC: Appropriate affect .  SKIN: maculopapular rash on chest and extremities  Right ant axillary line with erythema and possible cellulitis about 3inches started shrinking from the lines around the erythema      Labs:      138  |  104  |  12.0  ----------------------------<  125<H>  4.5   |  19.0<L>  |  0.68    Ca    8.9      2018 07:38  Phos  3.5       Mg     2.1         TPro  6.5<L>  /  Alb  3.7  /  TBili  0.2<L>  /  DBili  x   /  AST  32<H>  /  ALT  70<H>  /  AlkPhos  78                            11.3   4.0   )-----------( 218      ( 2018 07:38 )             35.3       Urinalysis Basic - ( 2018 20:49 )    Color: Yellow / Appearance: Clear / S.025 / pH: x  Gluc: x / Ketone: Moderate  / Bili: Negative / Urobili: Negative mg/dL   Blood: x / Protein: 30 mg/dL / Nitrite: Negative   Leuk Esterase: Negative / RBC: 0-2 /HPF / WBC 0-2   Sq Epi: x / Non Sq Epi: Occasional / Bacteria: Occasional      LIVER FUNCTIONS - ( 2018 07:38 )  Alb: 3.7 g/dL / Pro: 6.5 g/dL / ALK PHOS: 78 U/L / ALT: 70 U/L / AST: 32 U/L / GGT: x           CARDIAC MARKERS ( 2018 20:52 )  x     / x     / 94 U/L / x     / x        RECENT CULTURES:    All imaging and data are reviewed.

## 2018-06-23 NOTE — PROGRESS NOTE ADULT - ASSESSMENT
Patient is 58 year old female with PMH of Bilateral Tubal Ligation who presented to the ED with complaints of right upper chest wall circumscribed rash and admitted with cellulitis. Subsequently, developed an allergic reaction to antibiotics administered in the ED.    1. Right upper chest wall cellulitis  -IV daptomycin per ID. Monitor for Rhabdo closely. Initial CPK levels Normal  -f/u blood cultures, currently Pending   -florastar BID  -CT chest - slight asymmetric skin thickening in the superior lateral right chest. There is no subcutaneous stranding or fluid collection    2. Allergic reaction  -? PCN allergy  -IV benadryl  -Pepcid BID  -short course of prednisone    3. Nausea/vomiting  -likely secondary to bactrim, which has now resolved  -zofran PRN  -CT abdomen unremarkable    4. Transaminitis  -normal LFT on 6/14  -trend LFTS  -CT and ultrasound negative  - Likely related to recent Bactrim use     5. Anion gap metabolic acidosis  -secondary to starvation ketoacidosis  -lactate within normal limits  -patient with poor PO intake prior to presenting to the ED  -able to tolerate PO in the ER    6. Mild hyponatremia, -likely due to hypovolemia, resolved with IV hydration  -monitor serum sodium closely    6. DVT prophylaxis - early ambulation Lovenox SC Patient is 58 year old female with PMH of Bilateral Tubal Ligation who presented to the ED with complaints of right upper chest wall circumscribed rash and admitted with cellulitis. Subsequently, developed an allergic reaction to antibiotics administered in the ED.    1. Right upper chest wall cellulitis  -IV daptomycin per ID. Monitor for Rhabdo closely. Initial CPK levels Normal  -f/u blood cultures, currently Pending   -florastar BID  -CT chest - slight asymmetric skin thickening in the superior lateral right chest. There is no subcutaneous stranding or fluid collection    2. Allergic reaction/rash   -? PCN allergy  -IV benadryl  -Pepcid BID  -short course of prednisone    3. Nausea/vomiting : resolved   -likely secondary to bactrim, which has now resolved  Zofran PRN  -CT abdomen unremarkable    4. Transaminitis  -normal LFT on 6/14  -trend LFTS  -CT and ultrasound negative  - Likely related to recent Bactrim use     5. Anion gap metabolic acidosis  -secondary to starvation ketoacidosis  -lactate within normal limits  -patient with poor PO intake prior to presenting to the ED  -able to tolerate PO in the ER    6. Mild hyponatremia, -likely due to hypovolemia, resolved with IV hydration  -monitor serum sodium closely    6. DVT prophylaxis - early ambulation Lovenox SC

## 2018-06-23 NOTE — PROGRESS NOTE ADULT - ASSESSMENT
59y/o woman with no PMH was seen on 6/14 with left arm cellulitis and bactrim was started with improvement in cellulites but she was admitted with severe nausea and vomiting and a new cellulitis in R upper chest on ant axillary line.   GI symptoms are gone, LFTs improving, No fever.  Generalized rash 2' to one of the antibiotics she took, bactrim, zosyn and clindamycin (used very close to each other), unclear which one caused rash and allergy. So she was switched to daptomycin.    Cellulitis is improving.    1-cellulitis:  -Follow up blood cultures  -Cont daptomycin for now, can be switched to linezolid 600mg q12h or Doxycycline 100mg q12h due to her allergies, to take for one more week.   -Dermatology as outpt     2-Vomiting:  -Possible related to bactrim  -no more vomiting.   -ALT trending down

## 2018-06-23 NOTE — PROGRESS NOTE ADULT - SUBJECTIVE AND OBJECTIVE BOX
MILAGROS BRUCE  ----------------------------------------    CC:  Follow up visit for Rt sided chest wall cellulitis    Blood cx Pending  LFT's trending down  Discussed CT chest, Abdomen and USG results    Vital Signs Last 24 Hrs    T(C): 36.9 (2018 05:54), Max: 38.3 (2018 12:09)  T(F): 98.4 (2018 05:54), Max: 101 (2018 12:09)  HR: 73 (2018 05:54) (73 - 95)  BP: 108/57 (2018 05:54) (99/59 - 120/76)  BP(mean): --  RR: 18 (2018 05:54) (18 - 20)  SpO2: 96% (2018 21:55) (96% - 97%)    CAPILLARY BLOOD GLUCOSE        PHYSICAL EXAMINATION:  ----------------------------------------    General appearance: NAD, Awake, Alert  HEENT: NCAT, Conjunctiva clear, EOMI  Neck: Supple, No JVD  Lungs: Clear to auscultation, Breath sound equal bilaterally  Cardiovascular: S1S2, Regular rhythm  Abdomen: Soft, Nontender, Positive bowel sounds  Extremities: No clubbing, No cyanosis, No edema  CNS: alert      LABORATORY STUDIES:  ----------------------------------------                        11.3   4.0   )-----------( 218      ( 2018 07:38 )             35.3     06-23    138  |  104  |  12.0  ----------------------------<  125<H>  4.5   |  19.0<L>  |  0.68    Ca    8.9      2018 07:38  Phos  3.5     06-23  Mg     2.1     06-23    TPro  6.5<L>  /  Alb  3.7  /  TBili  0.2<L>  /  DBili  x   /  AST  32<H>  /  ALT  70<H>  /  AlkPhos  78  06-23    LIVER FUNCTIONS - ( 2018 07:38 )  Alb: 3.7 g/dL / Pro: 6.5 g/dL / ALK PHOS: 78 U/L / ALT: 70 U/L / AST: 32 U/L / GGT: x               CARDIAC MARKERS ( 2018 20:52 )  x     / x     / 94 U/L / x     / x          Urinalysis Basic - ( 2018 20:49 )    Color: Yellow / Appearance: Clear / S.025 / pH: x  Gluc: x / Ketone: Moderate  / Bili: Negative / Urobili: Negative mg/dL   Blood: x / Protein: 30 mg/dL / Nitrite: Negative   Leuk Esterase: Negative / RBC: 0-2 /HPF / WBC 0-2   Sq Epi: x / Non Sq Epi: Occasional / Bacteria: Occasional    MEDICATIONS  (STANDING):    DAPTOmycin IVPB 380 milliGRAM(s) IV Intermittent every 24 hours  enoxaparin Injectable 40 milliGRAM(s) SubCutaneous daily  famotidine    Tablet 20 milliGRAM(s) Oral two times a day  mupirocin 2% Ointment 1 Application(s) Topical every 8 hours  predniSONE   Tablet 40 milliGRAM(s) Oral daily  saccharomyces boulardii 250 milliGRAM(s) Oral two times a day    MEDICATIONS  (PRN):  acetaminophen   Tablet 650 milliGRAM(s) Oral every 6 hours PRN For Temp greater than 38 C (100.4 F)  diphenhydrAMINE   Injectable 50 milliGRAM(s) IV Push every 4 hours PRN Rash and/or Itching      ASSESSMENT / PLAN:  ---------------------------------------- MILAGROS BRUCE  ----------------------------------------    CC:  Follow up visit for Rt sided chest wall cellulitis    Blood cx Pending  LFT's trending down  Discussed CT chest, Abdomen and USG results  son at bed side, answered all questions   denies SOB or throat pain  wants to be off of Tele and     Vital Signs Last 24 Hrs    T(C): 36.9 (2018 05:54), Max: 38.3 (2018 12:09)  T(F): 98.4 (2018 05:54), Max: 101 (2018 12:09)  HR: 73 (2018 05:54) (73 - 95)  BP: 108/57 (2018 05:54) (99/59 - 120/76)  BP(mean): --  RR: 18 (2018 05:54) (18 - 20)  SpO2: 96% (2018 21:55) (96% - 97%)    CAPILLARY BLOOD GLUCOSE        PHYSICAL EXAMINATION:  ----------------------------------------    General appearance: NAD, Awake, Alert  HEENT: NCAT, Conjunctiva clear, EOMI  Neck: Supple, No JVD  Lungs: Clear to auscultation, Breath sound equal bilaterally  Cardiovascular: S1S2, Regular rhythm  Abdomen: Soft, Nontender, Positive bowel sounds  Extremities: No clubbing, No cyanosis, No edema  skin : diffuse rash over the chest and back, raised maculopapular blanching itchy rash  + Rt Sided chest wall cellulitis   CNS: alert and Oriented times 3       LABORATORY STUDIES:  ----------------------------------------                        11.3   4.0   )-----------( 218      ( 2018 07:38 )             35.3     06-23    138  |  104  |  12.0  ----------------------------<  125<H>  4.5   |  19.0<L>  |  0.68    Ca    8.9      2018 07:38  Phos  3.5     -  Mg     2.1     -    TPro  6.5<L>  /  Alb  3.7  /  TBili  0.2<L>  /  DBili  x   /  AST  32<H>  /  ALT  70<H>  /  AlkPhos  78      LIVER FUNCTIONS - ( 2018 07:38 )  Alb: 3.7 g/dL / Pro: 6.5 g/dL / ALK PHOS: 78 U/L / ALT: 70 U/L / AST: 32 U/L / GGT: x           CARDIAC MARKERS ( 2018 20:52 )  x     / x     / 94 U/L / x     / x        Urinalysis Basic - ( 2018 20:49 )    Color: Yellow / Appearance: Clear / S.025 / pH: x  Gluc: x / Ketone: Moderate  / Bili: Negative / Urobili: Negative mg/dL   Blood: x / Protein: 30 mg/dL / Nitrite: Negative   Leuk Esterase: Negative / RBC: 0-2 /HPF / WBC 0-2   Sq Epi: x / Non Sq Epi: Occasional / Bacteria: Occasional    MEDICATIONS  (STANDING):    DAPTOmycin IVPB 380 milliGRAM(s) IV Intermittent every 24 hours  enoxaparin Injectable 40 milliGRAM(s) SubCutaneous daily  famotidine    Tablet 20 milliGRAM(s) Oral two times a day  mupirocin 2% Ointment 1 Application(s) Topical every 8 hours  predniSONE   Tablet 40 milliGRAM(s) Oral daily  saccharomyces boulardii 250 milliGRAM(s) Oral two times a day    MEDICATIONS  (PRN):  acetaminophen   Tablet 650 milliGRAM(s) Oral every 6 hours PRN For Temp greater than 38 C (100.4 F)  diphenhydrAMINE   Injectable 50 milliGRAM(s) IV Push every 4 hours PRN Rash and/or Itching      ASSESSMENT / PLAN:  ----------------------------------------

## 2018-06-24 PROCEDURE — 99232 SBSQ HOSP IP/OBS MODERATE 35: CPT

## 2018-06-24 RX ORDER — DIPHENHYDRAMINE HCL 50 MG
25 CAPSULE ORAL ONCE
Qty: 0 | Refills: 0 | Status: COMPLETED | OUTPATIENT
Start: 2018-06-24 | End: 2018-06-24

## 2018-06-24 RX ADMIN — DAPTOMYCIN 115.2 MILLIGRAM(S): 500 INJECTION, POWDER, LYOPHILIZED, FOR SOLUTION INTRAVENOUS at 14:27

## 2018-06-24 RX ADMIN — Medication 25 MILLIGRAM(S): at 19:08

## 2018-06-24 RX ADMIN — Medication 40 MILLIGRAM(S): at 06:22

## 2018-06-24 RX ADMIN — FAMOTIDINE 20 MILLIGRAM(S): 10 INJECTION INTRAVENOUS at 17:06

## 2018-06-24 RX ADMIN — Medication 250 MILLIGRAM(S): at 06:21

## 2018-06-24 RX ADMIN — FAMOTIDINE 20 MILLIGRAM(S): 10 INJECTION INTRAVENOUS at 06:21

## 2018-06-24 RX ADMIN — Medication 50 MILLIGRAM(S): at 14:46

## 2018-06-24 RX ADMIN — Medication 250 MILLIGRAM(S): at 17:06

## 2018-06-24 NOTE — PROGRESS NOTE ADULT - ASSESSMENT
Patient is 58 year old female with PMH of Bilateral Tubal Ligation who presented to the ED with complaints of right upper chest wall circumscribed rash and admitted with cellulitis. Subsequently, developed an allergic reaction to antibiotics administered in the ED.    1. Right upper chest wall cellulitis  -IV daptomycin per ID. Monitor for Rhabdo closely. Initial CPK levels Normal  -f/u blood cultures, currently Pending   -florastar BID  -CT chest - slight asymmetric skin thickening in the superior lateral right chest. There is no subcutaneous stranding or fluid collection    2. Allergic reaction/rash   -? PCN allergy  -IV benadryl  -Pepcid BID  -short course of prednisone  - Outpatient Dermatology    3. Nausea/vomiting : resolved   -likely secondary to bactrim, which has now resolved  Zofran PRN  -CT abdomen unremarkable    4. Transaminitis  -normal LFT on 6/14  -trend LFTS, currently trending down   -CT and ultrasound negative  - Likely related to recent Bactrim use ( had bactrim Outpatient )    5. Mild hyponatremia, -likely due to hypovolemia, resolved with IV hydration  -monitor serum sodium closely    6. DVT prophylaxis - early ambulation Lovenox SC    Disposition :  Home, likely in am, PO Antibiotics as per ID

## 2018-06-24 NOTE — PROGRESS NOTE ADULT - SUBJECTIVE AND OBJECTIVE BOX
MILAGROS BRUCE  ----------------------------------------    CC:  Follow up visit for Rt sided chest wall cellulitis    Significant Improvement in cellulitis, compared to yesterday  Blood cx Pending  Labs Pending from today  denies SOB or throat pain      Vital Signs Last 24 Hrs    T(C): 36.6 (24 Jun 2018 11:14), Max: 36.9 (23 Jun 2018 21:48)  T(F): 97.8 (24 Jun 2018 11:14), Max: 98.5 (23 Jun 2018 21:48)  HR: 71 (24 Jun 2018 11:14) (61 - 71)  BP: 103/70 (24 Jun 2018 11:14) (103/70 - 125/89)  BP(mean): --  RR: 20 (24 Jun 2018 11:14) (18 - 20)  SpO2: 98% (23 Jun 2018 21:48) (98% - 98%)    CAPILLARY BLOOD GLUCOSE      PHYSICAL EXAMINATION:  ----------------------------------------    General appearance: NAD, Awake, Alert  HEENT: NCAT, Conjunctiva clear, EOMI  Neck: Supple, No JVD  Lungs: Clear to auscultation, Breath sound equal bilaterally  Cardiovascular: S1S2, Regular rhythm  Abdomen: Soft, Nontender, Positive bowel sounds  Extremities: No clubbing, No cyanosis, No edema  skin : diffuse rash over the chest and back, raised maculopapular blanching itchy rash  + Rt Sided chest wall cellulitis   CNS: alert and Oriented times 3     LABORATORY STUDIES:  ----------------------------------------                        11.3   4.0   )-----------( 218      ( 23 Jun 2018 07:38 )             35.3     06-23    138  |  104  |  12.0  ----------------------------<  125<H>  4.5   |  19.0<L>  |  0.68    Ca    8.9      23 Jun 2018 07:38  Phos  3.5     06-23  Mg     2.1     06-23    TPro  6.5<L>  /  Alb  3.7  /  TBili  0.2<L>  /  DBili  x   /  AST  32<H>  /  ALT  70<H>  /  AlkPhos  78  06-23    LIVER FUNCTIONS - ( 23 Jun 2018 07:38 )  Alb: 3.7 g/dL / Pro: 6.5 g/dL / ALK PHOS: 78 U/L / ALT: 70 U/L / AST: 32 U/L / GGT: x             MEDICATIONS  (STANDING):    DAPTOmycin IVPB 380 milliGRAM(s) IV Intermittent every 24 hours  enoxaparin Injectable 40 milliGRAM(s) SubCutaneous daily  famotidine    Tablet 20 milliGRAM(s) Oral two times a day  mupirocin 2% Ointment 1 Application(s) Topical every 8 hours  predniSONE   Tablet 40 milliGRAM(s) Oral daily  saccharomyces boulardii 250 milliGRAM(s) Oral two times a day    MEDICATIONS  (PRN):  acetaminophen   Tablet 650 milliGRAM(s) Oral every 6 hours PRN For Temp greater than 38 C (100.4 F)  acetaminophen   Tablet. 650 milliGRAM(s) Oral every 6 hours PRN Mild Pain (1 - 3)  diphenhydrAMINE   Injectable 50 milliGRAM(s) IV Push every 4 hours PRN Rash and/or Itching      ASSESSMENT / PLAN:  ----------------------------------------

## 2018-06-25 ENCOUNTER — TRANSCRIPTION ENCOUNTER (OUTPATIENT)
Age: 58
End: 2018-06-25

## 2018-06-25 VITALS — SYSTOLIC BLOOD PRESSURE: 123 MMHG | HEART RATE: 75 BPM | DIASTOLIC BLOOD PRESSURE: 77 MMHG

## 2018-06-25 LAB
ALBUMIN SERPL ELPH-MCNC: 4 G/DL — SIGNIFICANT CHANGE UP (ref 3.3–5.2)
ALP SERPL-CCNC: 73 U/L — SIGNIFICANT CHANGE UP (ref 40–120)
ALT FLD-CCNC: 56 U/L — HIGH
ANION GAP SERPL CALC-SCNC: 13 MMOL/L — SIGNIFICANT CHANGE UP (ref 5–17)
AST SERPL-CCNC: 19 U/L — SIGNIFICANT CHANGE UP
BILIRUB SERPL-MCNC: <0.2 MG/DL — LOW (ref 0.4–2)
BUN SERPL-MCNC: 17 MG/DL — SIGNIFICANT CHANGE UP (ref 8–20)
CALCIUM SERPL-MCNC: 8.8 MG/DL — SIGNIFICANT CHANGE UP (ref 8.6–10.2)
CHLORIDE SERPL-SCNC: 102 MMOL/L — SIGNIFICANT CHANGE UP (ref 98–107)
CO2 SERPL-SCNC: 27 MMOL/L — SIGNIFICANT CHANGE UP (ref 22–29)
CREAT SERPL-MCNC: 0.71 MG/DL — SIGNIFICANT CHANGE UP (ref 0.5–1.3)
GLUCOSE SERPL-MCNC: 88 MG/DL — SIGNIFICANT CHANGE UP (ref 70–115)
POTASSIUM SERPL-MCNC: 3.8 MMOL/L — SIGNIFICANT CHANGE UP (ref 3.5–5.3)
POTASSIUM SERPL-SCNC: 3.8 MMOL/L — SIGNIFICANT CHANGE UP (ref 3.5–5.3)
PROT SERPL-MCNC: 6.4 G/DL — LOW (ref 6.6–8.7)
SODIUM SERPL-SCNC: 142 MMOL/L — SIGNIFICANT CHANGE UP (ref 135–145)

## 2018-06-25 PROCEDURE — 99239 HOSP IP/OBS DSCHRG MGMT >30: CPT

## 2018-06-25 PROCEDURE — 80053 COMPREHEN METABOLIC PANEL: CPT

## 2018-06-25 PROCEDURE — G0378: CPT

## 2018-06-25 PROCEDURE — 83605 ASSAY OF LACTIC ACID: CPT

## 2018-06-25 PROCEDURE — 83690 ASSAY OF LIPASE: CPT

## 2018-06-25 PROCEDURE — 85027 COMPLETE CBC AUTOMATED: CPT

## 2018-06-25 PROCEDURE — 71260 CT THORAX DX C+: CPT

## 2018-06-25 PROCEDURE — 99285 EMERGENCY DEPT VISIT HI MDM: CPT | Mod: 25

## 2018-06-25 PROCEDURE — 76705 ECHO EXAM OF ABDOMEN: CPT

## 2018-06-25 PROCEDURE — 87040 BLOOD CULTURE FOR BACTERIA: CPT

## 2018-06-25 PROCEDURE — 82550 ASSAY OF CK (CPK): CPT

## 2018-06-25 PROCEDURE — 36415 COLL VENOUS BLD VENIPUNCTURE: CPT

## 2018-06-25 PROCEDURE — 83735 ASSAY OF MAGNESIUM: CPT

## 2018-06-25 PROCEDURE — 96374 THER/PROPH/DIAG INJ IV PUSH: CPT | Mod: XU

## 2018-06-25 PROCEDURE — 96376 TX/PRO/DX INJ SAME DRUG ADON: CPT

## 2018-06-25 PROCEDURE — 96375 TX/PRO/DX INJ NEW DRUG ADDON: CPT | Mod: XU

## 2018-06-25 PROCEDURE — 81001 URINALYSIS AUTO W/SCOPE: CPT

## 2018-06-25 PROCEDURE — 84100 ASSAY OF PHOSPHORUS: CPT

## 2018-06-25 PROCEDURE — 74177 CT ABD & PELVIS W/CONTRAST: CPT

## 2018-06-25 RX ORDER — SACCHAROMYCES BOULARDII 250 MG
1 POWDER IN PACKET (EA) ORAL
Qty: 15 | Refills: 0 | OUTPATIENT
Start: 2018-06-25

## 2018-06-25 RX ORDER — FAMOTIDINE 10 MG/ML
1 INJECTION INTRAVENOUS
Qty: 10 | Refills: 0 | OUTPATIENT
Start: 2018-06-25

## 2018-06-25 RX ADMIN — FAMOTIDINE 20 MILLIGRAM(S): 10 INJECTION INTRAVENOUS at 17:58

## 2018-06-25 RX ADMIN — Medication 250 MILLIGRAM(S): at 05:41

## 2018-06-25 RX ADMIN — FAMOTIDINE 20 MILLIGRAM(S): 10 INJECTION INTRAVENOUS at 05:42

## 2018-06-25 RX ADMIN — Medication 40 MILLIGRAM(S): at 05:41

## 2018-06-25 RX ADMIN — Medication 250 MILLIGRAM(S): at 17:58

## 2018-06-25 RX ADMIN — Medication 100 MILLIGRAM(S): at 15:05

## 2018-06-25 NOTE — DISCHARGE NOTE ADULT - MEDICATION SUMMARY - MEDICATIONS TO TAKE
I will START or STAY ON the medications listed below when I get home from the hospital:    predniSONE 10 mg oral tablet  -- 3 tab(s) by mouth once a day  Taper by 1 tab daily.   -- It is very important that you take or use this exactly as directed.  Do not skip doses or discontinue unless directed by your doctor.  Obtain medical advice before taking any non-prescription drugs as some may affect the action of this medication.  Take with food or milk.    -- Indication: For Allergic Reaction    doxycycline monohydrate 100 mg oral capsule  -- 1 cap(s) by mouth every 12 hours  -- Indication: For Cellulitis    famotidine 20 mg oral tablet  -- 1 tab(s) by mouth 2 times a day  -- Indication: For Allergic Reaction    saccharomyces boulardii lyo 250 mg oral capsule  -- 1 cap(s) by mouth 2 times a day  -- Indication: For Supplement

## 2018-06-25 NOTE — DISCHARGE NOTE ADULT - PATIENT PORTAL LINK FT
You can access the [facility] Patient Portal, offered by [facility], by registering with the following website: [email or web address]

## 2018-06-25 NOTE — DISCHARGE NOTE ADULT - CARE PROVIDER_API CALL
Donavan Miles), Medicine Pediatrics  [facility] [place] [address]  Phone: [phone]  Fax: [phone]    Kera Torres), Infectious Disease; Internal Medicine  [address] [place] [address]  Phone: [phone]  Fax: [phone]

## 2018-06-25 NOTE — DISCHARGE NOTE ADULT - HOSPITAL COURSE
Patient: MILAGROS BRUCE [id number]                 Internal Medicine Hospitalist Discharge Note    Chief Complaint: Patient is a 58y old  Female who presents with a chief complaint of rash (22 Jun 2018 16:43)    HPI:  Patient is 58 year old female with PMH of Bilateral Tubal Ligation who presented to the ED with complaints of right upper chest wall circumscribed rash. Patient reports she initially reported to the ED on 6/14 for evaluation of a left arm rash. She had went to see her PMD who prescribed an antifungal cream, but she noted increasing erythema on the medial aspect of left arm and was sent to the ED. Patient was then diagnosed with Erysipelas and discharged home with Bactrim. Patient reports on the 8th day she developed a right upper chest wall rash. She initially thought it was a reaction to the bactrim, but was instructed to report to the ED due to concerns for cellulitis. In the ED, patient was given clindamycin, followed by vanco and zosyn and developed a generalized macular-papular rash (chest/abdomen/back) without any respiratory compromise. ID was consulted and patient was switched to Daptomycin. Patient also reports having diarrhea while on bactrim, which has resolved but yesterday she reports having abdominal pain and vomiting. In the ED, patient had a CT abdomen/pelvis and US which was negative for any intra-abdominal pathology. Patient does report having a fever, but denies chills, headache, blurry vision, chest pain, SOB, nausea/vomiting or diarrhea at this present time. LFTS mildly elevated and likely due to bactrim. (22 Jun 2018 16:43)    Rash has resolved.  Upper extremity erythema improved.  Pt without complaints.  No SOB.  No additional complaints.     Patient is 58 year old female with PMH of Bilateral Tubal Ligation who presented to the ED with complaints of right upper chest wall circumscribed rash and admitted with cellulitis. Subsequently, developed an allergic reaction to antibiotics administered in the ED.    1. Right upper chest wall cellulitis -Abx change to Linezolid / Doxycycline.  Plan d/c home today.  Outpatient followup with ID.     2. Allergic reaction/rash - unclear cause of allergic reaction.  Prednisone taper.  Discussed outpatient allergy followup.     3. Nausea/vomiting : resolved.      4. Transaminitis - trend LFTS, currently trending down.  Likely due to medications.  Outpt followup discussed.     5. Mild hyponatremia, -likely due to hypovolemia, resolved with IV hydration.      6. DVT prophylaxis - early ambulation Lovenox SC    Disposition: stable for discharge.  Outpatient followup as above.  Patient was advised to return if they experience any recurrence or worsening of symptoms.  Total time spent on discharge was 35 minutes.  -Sarthak Milian D.O., Hospitalist, [facility]

## 2018-06-25 NOTE — PROGRESS NOTE ADULT - SUBJECTIVE AND OBJECTIVE BOX
Patient: MILAGROS BRUCE [id number] 58y Female                           Internal Medicine Hospitalist Progress Note  Chief Complaint: Patient is a 58y old  Female who presents with a chief complaint of rash (2018 16:43)    HPI:  Patient is 58 year old female with PMH of Bilateral Tubal Ligation who presented to the ED with complaints of right upper chest wall circumscribed rash. Patient reports she initially reported to the ED on  for evaluation of a left arm rash. She had went to see her PMD who prescribed an antifungal cream, but she noted increasing erythema on the medial aspect of left arm and was sent to the ED. Patient was then diagnosed with Erysipelas and discharged home with Bactrim. Patient reports on the 8th day she developed a right upper chest wall rash. She initially thought it was a reaction to the bactrim, but was instructed to report to the ED due to concerns for cellulitis. In the ED, patient was given clindamycin, followed by vanco and zosyn and developed a generalized macular-papular rash (chest/abdomen/back) without any respiratory compromise. ID was consulted and patient was switched to Daptomycin. Patient also reports having diarrhea while on bactrim, which has resolved but yesterday she reports having abdominal pain and vomiting. In the ED, patient had a CT abdomen/pelvis and US which was negative for any intra-abdominal pathology. Patient does report having a fever, but denies chills, headache, blurry vision, chest pain, SOB, nausea/vomiting or diarrhea at this present time. LFTS mildly elevated and likely due to bactrim. (2018 16:43)    Rash has resolved.  Upper extremity erythema improved.  Pt without complaints.  No SOB.  No additional complaints.     ____________________PHYSICAL EXAM:  Vitals reviewed as indicated below  GENERAL:  NAD Alert and Oriented x 3   HEENT: NCAT  CARDIOVASCULAR:  S1, S2  LUNGS: CTAB  ABDOMEN:  soft, (-) tenderness, (-) distension, (+) bowel sounds, (-) guarding, (-) rebound (-) rigidity  EXTREMITIES:  no cyanosis / clubbing / edema.   SKIN: faint generalized macular rash.    ____________________      BACKGROUND:  HEALTH ISSUES - PROBLEM Dx:        Allergies    clindamycin (Rash)  penicillin (Rash)    Intolerances    gluten intolerance (Unknown)    PAST MEDICAL & SURGICAL HISTORY:  No pertinent past medical history  H/O tubal ligation  H/O  section: x1  History of knee surgery  History of deviated nasal septum        VITALS:  Vital Signs Last 24 Hrs  T(C): 36.5 (2018 11:39), Max: 36.7 (2018 14:53)  T(F): 97.7 (2018 11:39), Max: 98.1 (2018 14:53)  HR: 69 (2018 11:39) (62 - 72)  BP: 121/74 (2018 11:39) (103/72 - 137/82)  BP(mean): --  RR: 18 (2018 11:39) (18 - 18)  SpO2: 98% (2018 14:53) (98% - 98%) Daily     Daily   CAPILLARY BLOOD GLUCOSE        I&O's Summary        LABS:        142  |  102  |  17.0  ----------------------------<  88  3.8   |  27.0  |  0.71    Ca    8.8      2018 06:08    TPro  6.4<L>  /  Alb  4.0  /  TBili  <0.2<L>  /  DBili  x   /  AST  19  /  ALT  56<H>  /  AlkPhos  73  06-25      LIVER FUNCTIONS - ( 2018 06:08 )  Alb: 4.0 g/dL / Pro: 6.4 g/dL / ALK PHOS: 73 U/L / ALT: 56 U/L / AST: 19 U/L / GGT: x                     MEDICATIONS:  MEDICATIONS  (STANDING):  DAPTOmycin IVPB 380 milliGRAM(s) IV Intermittent every 24 hours  enoxaparin Injectable 40 milliGRAM(s) SubCutaneous daily  famotidine    Tablet 20 milliGRAM(s) Oral two times a day  mupirocin 2% Ointment 1 Application(s) Topical every 8 hours  predniSONE   Tablet 40 milliGRAM(s) Oral daily  saccharomyces boulardii 250 milliGRAM(s) Oral two times a day    MEDICATIONS  (PRN):  acetaminophen   Tablet 650 milliGRAM(s) Oral every 6 hours PRN For Temp greater than 38 C (100.4 F)  acetaminophen   Tablet. 650 milliGRAM(s) Oral every 6 hours PRN Mild Pain (1 - 3)  diphenhydrAMINE   Injectable 50 milliGRAM(s) IV Push every 4 hours PRN Rash and/or Itching

## 2018-06-25 NOTE — PROGRESS NOTE ADULT - ASSESSMENT
Patient is 58 year old female with PMH of Bilateral Tubal Ligation who presented to the ED with complaints of right upper chest wall circumscribed rash and admitted with cellulitis. Subsequently, developed an allergic reaction to antibiotics administered in the ED.    1. Right upper chest wall cellulitis -Abx change to Linezolid / Doxycycline.  Plan d/c home today.  Outpatient followup with ID.     2. Allergic reaction/rash - unclear cause of allergic reaction.  Prednisone taper.  Discussed outpatient allergy followup.     3. Nausea/vomiting : resolved.      4. Transaminitis - trend LFTS, currently trending down.  Likely due to medications.  Outpt followup discussed.     5. Mild hyponatremia, -likely due to hypovolemia, resolved with IV hydration.      6. DVT prophylaxis - early ambulation Lovenox SC    > DVT Prophylaxis - Lovenox subcut

## 2018-06-25 NOTE — DISCHARGE NOTE ADULT - MEDICATION SUMMARY - MEDICATIONS TO STOP TAKING
I will STOP taking the medications listed below when I get home from the hospital:    Bactrim  mg-160 mg oral tablet  -- 1 tab(s) by mouth 2 times a day   -- Avoid prolonged or excessive exposure to direct and/or artificial sunlight while taking this medication.  Finish all this medication unless otherwise directed by prescriber.  Medication should be taken with plenty of water.

## 2018-06-27 LAB
CULTURE RESULTS: SIGNIFICANT CHANGE UP
CULTURE RESULTS: SIGNIFICANT CHANGE UP
SPECIMEN SOURCE: SIGNIFICANT CHANGE UP
SPECIMEN SOURCE: SIGNIFICANT CHANGE UP

## 2019-08-01 NOTE — H&P ADULT - NECK DETAILS
Medicare Wellness Visit  Plan for Preventive Care    A good way for you to stay healthy is to use preventive care.  Medicare covers many services that can help you stay healthy.* The goal of these services is to find any health problems as quickly as possible. Finding problems early can help make them easier to treat.  Your personal plan below lists the services you may need and when they are due.     Health Maintenance Summary     DTaP/Tdap/Td Vaccine (1 - Tdap)  Overdue since 1/27/1949    Shingles Vaccine (1 of 2)  Overdue since 1/27/1980    Medicare Wellness 65+ (Yearly)  Overdue since 5/2/2019    Influenza Vaccine (1)  Next due on 9/1/2019    Depression Screening (Yearly)  Next due on 7/1/2020    Pneumococcal Vaccine 65+   Completed    Meningococcal Vaccine   Aged Out           Preventive Care for Women and Men    Heart Screenings (Cardiovascular):  · Blood tests are used to check your cholesterol, lipid and triglyceride levels. High levels can increase your risk for heart disease and stroke. High levels can be treated with medications, diet and exercise. Lowering your levels can help keep your heart and blood vessels healthy.  Your provider will order these tests if they are needed.    · An ultrasound is done to see if you have an abdominal aortic aneurysm (AAA).  This is an enlargement of one of the main blood vessels that delivers blood to the body.   In the [place], 9,000 deaths are caused by AAA.  You may not even know you have this problem and as many as 1 in 3 people will have a serious problem if it is not treated.  Early diagnosis allows for more effective treatment and cure.  If you have a family history of AAA or are a male age 65-75 who has smoked, you are at higher risk of an AAA.  Your provider can order this test, if needed.    Colorectal Screening:  · There are many tests that are used to check for cancer of your colon and rectum. You and your provider should discuss what test is best for  you and when to have it done.  Options include:  · Screening Colonoscopy: exam of the entire colon, seen through a flexible lighted tube.  · Flexible Sigmoidoscopy: exam of the last third (sigmoid portion) of the colon and rectum, seen through a flexible lighted tube.  · Cologuard DNA stool test: a sample of your stool is used to screen for cancer and unseen blood in your stool.  · Fecal Occult Blood Test: a sample of your stool is studied to find any unseen blood    Flu Shot:  · An immunization that helps to prevent influenza (the flu). You should get this every year. The best time to get the shot is in the fall.    Pneumococcal Shot:  • Vaccines are available that can help prevent pneumococcal disease, which is any type of infection caused by Streptococcus pneumoniae bacteria.   Their use can prevent some cases of pneumonia, meningitis, and sepsis. There are two types of pneumococcal vaccines:   o Conjugate vaccines (PCV-13 or Prevnar 13®) - helps protect against the 13 types of pneumococcal bacteria that are the most common causes of serious infections in children and adults.    o Polysaccharide vaccine (PPSV23 or Iohnxlqrv16®) - helps protect against 23 types of pneumococcal bacteria for patients who are recommended to get it.  These vaccines should be given at least 12 months apart.  A booster is usually not needed.     Hepatitis B Shot:  · An immunization that helps to protect people from getting Hepatitis B. Hepatitis B is a virus that spreads through contact with infected blood or body fluids. Many people with the virus do not have symptoms.  The virus can lead to serious problems, such as liver disease. Some people are at higher risk than others. Your doctor will tell you if you need this shot.     Diabetes Screening:  · A test to measure sugar (glucose) in your blood is called a fasting blood sugar. Fasting means you cannot have food or drink for at least 8 hours before the test. This test can detect  diabetes long before you may notice symptoms.    Glaucoma Screening:  · Glaucoma screening is performed by your eye doctor. The test measures the fluid pressure inside your eyes to determine if you have glaucoma.     Hepatitis C Screening:  · A blood test to see if you have the hepatitis C virus.  Hepatitis C attacks the liver and is a major cause of chronic liver disease.  Medicare will cover a single screening for all adults born between 1945 & 1965, or high risk patients (people who have injected illegal drugs or people who have had blood transfusions).  High risk patients who continue to inject illegal drugs can be screened for Hepatitis C every year.    Smoking and Tobacco-Use Cessation Counseling:  · Tobacco is the single greatest cause of disease and early death in our country today. Medication and counseling together can increase a person’s chance of quitting for good.   · Medicare covers two quitting attempts per year, with four counseling sessions per attempt (eight sessions in a 12 month period)    Preventive Screening tests for Women    Screening Mammograms and Breast Exams:  · An x-ray of your breasts to check for breast cancer before you or your doctor may be able to feel it.  If breast cancer is found early it can usually be treated with success.    Pelvic Exams and Pap Tests:  · An exam to check for cervical and vaginal cancer. A Pap test is a lab test in which cells are taken from your cervix and sent to the lab to look for signs of cervical cancer. If cancer of the cervix is found early, chances for a cure are good. Testing can generally end at age 65, or if a woman has a hysterectomy for a benign condition. Your provider may recommend more frequent testing if certain abnormal results are found.    Bone Mass Measurements:  · A painless x-ray of your bone density to see if you are at risk for a broken bone. Bone density refers to the thickness of bones or how tightly the bone tissue is  packed.    Preventive Screening tests for Men    Prostate Screening:  · PSA - Prostate Cancer blood test.  Experts do not recommend routine screening of healthy men with no signs or symptoms of prostate disease.  However, men should not ignore urinary symptoms, and should discuss their family history with their doctor.    *Medicare pays for many preventive services to keep you healthy. For some of these services, you might have to pay a deductible, coinsurance, and / or copayment.  The amounts vary depending on the type of services you need and the kind of Medicare health plan you have.               supple

## 2021-01-05 NOTE — H&P ADULT - ENMT
Detail Level: Detailed Render Post-Care Instructions In Note?: no Consent: The patient's consent was obtained including but not limited to risks of crusting, scabbing, blistering, scarring, darker or lighter pigmentary change, recurrence, incomplete removal and infection. Duration Of Freeze Thaw-Cycle (Seconds): 0 Number Of Freeze-Thaw Cycles: 1 freeze-thaw cycle Post-Care Instructions: I reviewed with the patient in detail post-care instructions. Patient is to wear sunprotection, and avoid picking at any of the treated lesions. Pt may apply Vaseline to crusted or scabbing areas. detailed exam mouth

## 2022-11-11 NOTE — DISCHARGE NOTE ADULT - CARE PLAN
Wheelchair/Stroller Principal Discharge DX:	Cellulitis of left upper extremity  Goal:	stable for discharge  Assessment and plan of treatment:	It is important to see your primary physician as well as the physicians noted below within the next week to perform a comprehensive medical review.  Call their offices for an appointment as soon as you leave the hospital.  If you do not have a primary physician, contact the [facility] [place] [phone] located on [address] [place] [address].  Your medical issues appear to be stable at this time, but if your symptoms recur or worsen, contact your physicians and/or return to the hospital if necessary.  If you encounter any issues or questions with your medication, call your physicians before stopping the medication.  Do not drive.  Limit your diet to 2 grams of sodium daily.  Secondary Diagnosis:	Allergic reaction to drug, initial encounter  Secondary Diagnosis:	Hyponatremia  Secondary Diagnosis:	Transaminitis

## 2025-07-26 NOTE — H&P ADULT - SKIN
Received page from lab for critical results. Call placed to lab and results received.Call placed to patient for critical lab results right after.  Talked to patient about acute kidney injury with significantly elevated creatinine and reduced GFR as well as elevated bilirubin and increase in AST levels.recommended patient to go to ED.  Patient asks about what could have caused the changes in kidney function, differentials discussed. Patient  mentions that he just got out of hospital and if this can wait for visit with dr Gutierrez on 7/31/2025, recommended patient that prompt evaluation and treatment is important for ISABELLE.  Recommended strongly to patient multiple times on importance to go to ED as soon as possible.  All patient questions answered over the phone.    
detailed exam